# Patient Record
Sex: MALE | URBAN - METROPOLITAN AREA
[De-identification: names, ages, dates, MRNs, and addresses within clinical notes are randomized per-mention and may not be internally consistent; named-entity substitution may affect disease eponyms.]

---

## 2023-10-06 ENCOUNTER — CLAIMS CREATED FROM THE CLAIM WINDOW (OUTPATIENT)
Dept: URBAN - METROPOLITAN AREA MEDICAL CENTER 9 | Facility: MEDICAL CENTER | Age: 68
End: 2023-10-06
Payer: COMMERCIAL

## 2023-10-06 DIAGNOSIS — Z79.01 ADEQUATE ANTICOAGULATION ON ANTICOAGULANT THERAPY: ICD-10-CM

## 2023-10-06 DIAGNOSIS — K62.5 HEMORRHAGE OF ANUS AND RECTUM: ICD-10-CM

## 2023-10-06 PROCEDURE — G8427 DOCREV CUR MEDS BY ELIG CLIN: HCPCS | Performed by: PHYSICIAN ASSISTANT

## 2023-10-06 PROCEDURE — 99221 1ST HOSP IP/OBS SF/LOW 40: CPT | Performed by: PHYSICIAN ASSISTANT

## 2023-10-06 PROCEDURE — 99253 IP/OBS CNSLTJ NEW/EST LOW 45: CPT | Performed by: PHYSICIAN ASSISTANT

## 2023-10-08 ENCOUNTER — CLAIMS CREATED FROM THE CLAIM WINDOW (OUTPATIENT)
Dept: URBAN - METROPOLITAN AREA MEDICAL CENTER 9 | Facility: MEDICAL CENTER | Age: 68
End: 2023-10-08
Payer: COMMERCIAL

## 2023-10-08 DIAGNOSIS — D64.89 ANEMIA DUE TO OTHER CAUSE: ICD-10-CM

## 2023-10-08 DIAGNOSIS — Z79.01 ADEQUATE ANTICOAGULATION ON ANTICOAGULANT THERAPY: ICD-10-CM

## 2023-10-08 DIAGNOSIS — K62.5 HEMORRHAGE OF ANUS AND RECTUM: ICD-10-CM

## 2023-10-08 PROCEDURE — 99232 SBSQ HOSP IP/OBS MODERATE 35: CPT | Performed by: INTERNAL MEDICINE

## 2023-10-09 ENCOUNTER — CLAIMS CREATED FROM THE CLAIM WINDOW (OUTPATIENT)
Dept: URBAN - METROPOLITAN AREA MEDICAL CENTER 9 | Facility: MEDICAL CENTER | Age: 68
End: 2023-10-09
Payer: COMMERCIAL

## 2023-10-09 DIAGNOSIS — D50.9 ANEMIA: ICD-10-CM

## 2023-10-09 DIAGNOSIS — K62.5 ANAL BLEEDING: ICD-10-CM

## 2023-10-09 DIAGNOSIS — K29.60 ADENOPAPILLOMATOSIS GASTRICA: ICD-10-CM

## 2023-10-09 PROCEDURE — 45378 DIAGNOSTIC COLONOSCOPY: CPT | Performed by: INTERNAL MEDICINE

## 2023-10-09 PROCEDURE — 43239 EGD BIOPSY SINGLE/MULTIPLE: CPT | Performed by: INTERNAL MEDICINE

## 2023-11-03 ENCOUNTER — OFFICE VISIT (OUTPATIENT)
Dept: URBAN - METROPOLITAN AREA CLINIC 40 | Facility: CLINIC | Age: 68
End: 2023-11-03

## 2023-11-03 NOTE — HPI-TODAY'S VISIT:
68-year-old male patient with past medical history as listed below seen in [facility] by Dr. 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  Possible GI bleed due to inflammation versus neoplasia versus diverticular or hemorrhoidal bleed.  EGD for iron deficiency anemia showed small hiatal hernia and gastritis.  Colonoscopy showed multiple medium mouth diverticula in the descending colon and sigmoid colon internal hemorrhoids grade 2 otherwise normal no specimens collected.  Focal chronic gastritis and rare acute inflammation in the lamina propria negative for H. pylori organisms found on gastric biopsies.

## 2023-11-08 ENCOUNTER — OFFICE VISIT (OUTPATIENT)
Dept: URBAN - METROPOLITAN AREA CLINIC 40 | Facility: CLINIC | Age: 68
End: 2023-11-08

## 2023-11-08 ENCOUNTER — DASHBOARD ENCOUNTERS (OUTPATIENT)
Age: 68
End: 2023-11-08

## 2023-11-08 NOTE — HPI-TODAY'S VISIT:
68-year-old male patient with past medical history as listed below seen in [facility] by Dr. 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 Possible GI bleed due to inflammation versus neoplasia versus diverticular or hemorrhoidal bleed. EGD for iron deficiency anemia showed small hiatal hernia and gastritis. Colonoscopy showed multiple medium mouth diverticula in the descending colon and sigmoid colon internal hemorrhoids grade 2 otherwise normal no specimens collected. Focal chronic gastritis and rare acute inflammation in the lamina propria negative for H. pylori organisms found on gastric biopsies.